# Patient Record
Sex: FEMALE | Race: BLACK OR AFRICAN AMERICAN
[De-identification: names, ages, dates, MRNs, and addresses within clinical notes are randomized per-mention and may not be internally consistent; named-entity substitution may affect disease eponyms.]

---

## 2019-12-19 ENCOUNTER — HOSPITAL ENCOUNTER (OUTPATIENT)
Dept: HOSPITAL 19 - COL.RAD | Age: 29
End: 2019-12-19
Attending: PHYSICIAN ASSISTANT
Payer: OTHER GOVERNMENT

## 2019-12-19 VITALS — SYSTOLIC BLOOD PRESSURE: 165 MMHG | DIASTOLIC BLOOD PRESSURE: 108 MMHG | HEART RATE: 73 BPM

## 2019-12-19 VITALS — HEART RATE: 72 BPM | DIASTOLIC BLOOD PRESSURE: 104 MMHG | SYSTOLIC BLOOD PRESSURE: 160 MMHG

## 2019-12-19 VITALS — WEIGHT: 181.88 LBS | BODY MASS INDEX: 28.55 KG/M2 | HEIGHT: 67.01 IN

## 2019-12-19 VITALS — DIASTOLIC BLOOD PRESSURE: 102 MMHG | HEART RATE: 76 BPM | SYSTOLIC BLOOD PRESSURE: 158 MMHG

## 2019-12-19 VITALS — HEART RATE: 77 BPM | DIASTOLIC BLOOD PRESSURE: 102 MMHG | SYSTOLIC BLOOD PRESSURE: 150 MMHG

## 2019-12-19 VITALS — HEART RATE: 73 BPM | SYSTOLIC BLOOD PRESSURE: 155 MMHG | DIASTOLIC BLOOD PRESSURE: 106 MMHG

## 2019-12-19 VITALS — DIASTOLIC BLOOD PRESSURE: 102 MMHG | HEART RATE: 78 BPM | SYSTOLIC BLOOD PRESSURE: 152 MMHG

## 2019-12-19 VITALS — SYSTOLIC BLOOD PRESSURE: 152 MMHG | DIASTOLIC BLOOD PRESSURE: 102 MMHG | HEART RATE: 77 BPM

## 2019-12-19 VITALS — SYSTOLIC BLOOD PRESSURE: 164 MMHG | DIASTOLIC BLOOD PRESSURE: 109 MMHG | HEART RATE: 91 BPM

## 2019-12-19 DIAGNOSIS — R74.8: ICD-10-CM

## 2019-12-19 DIAGNOSIS — B19.10: Primary | ICD-10-CM

## 2019-12-19 LAB
INR BLD: 1.1 (ref 0.8–3)
PROTHROMBIN TIME: 12.5 SECONDS (ref 9.7–12.8)

## 2019-12-19 NOTE — NUR
liver biopsy completed and tissue in formulin and taken to lab, pt to rad
holding area via w/c, bandaid to abd. is clean and dry.